# Patient Record
Sex: MALE | Race: BLACK OR AFRICAN AMERICAN | NOT HISPANIC OR LATINO | Employment: FULL TIME | URBAN - METROPOLITAN AREA
[De-identification: names, ages, dates, MRNs, and addresses within clinical notes are randomized per-mention and may not be internally consistent; named-entity substitution may affect disease eponyms.]

---

## 2017-05-15 ENCOUNTER — NON-PROVIDER VISIT (OUTPATIENT)
Dept: OCCUPATIONAL MEDICINE | Facility: CLINIC | Age: 22
End: 2017-05-15

## 2017-05-15 DIAGNOSIS — Z02.1 PRE-EMPLOYMENT DRUG SCREENING: ICD-10-CM

## 2017-05-15 LAB
AMP AMPHETAMINE: NORMAL
COC COCAINE: NORMAL
INT CON NEG: NORMAL
INT CON POS: NORMAL
MET METHAMPHETAMINES: NORMAL
OPI OPIATES: NORMAL
PCP PHENCYCLIDINE: NORMAL
POC DRUG COMMENT 753798-OCCUPATIONAL HEALTH: NEGATIVE
THC: NORMAL

## 2017-05-15 PROCEDURE — 80305 DRUG TEST PRSMV DIR OPT OBS: CPT | Performed by: PREVENTIVE MEDICINE

## 2025-02-05 ENCOUNTER — OFFICE VISIT (OUTPATIENT)
Dept: URGENT CARE | Facility: CLINIC | Age: 30
End: 2025-02-05
Payer: COMMERCIAL

## 2025-02-05 VITALS
RESPIRATION RATE: 16 BRPM | DIASTOLIC BLOOD PRESSURE: 80 MMHG | HEIGHT: 66 IN | OXYGEN SATURATION: 95 % | HEART RATE: 87 BPM | TEMPERATURE: 97.1 F | WEIGHT: 192.2 LBS | SYSTOLIC BLOOD PRESSURE: 122 MMHG | BODY MASS INDEX: 30.89 KG/M2

## 2025-02-05 DIAGNOSIS — J10.1 INFLUENZA A: ICD-10-CM

## 2025-02-05 DIAGNOSIS — J02.9 PHARYNGITIS, UNSPECIFIED ETIOLOGY: ICD-10-CM

## 2025-02-05 LAB
FLUAV RNA SPEC QL NAA+PROBE: POSITIVE
FLUBV RNA SPEC QL NAA+PROBE: NEGATIVE
RSV RNA SPEC QL NAA+PROBE: NEGATIVE
S PYO DNA SPEC NAA+PROBE: NOT DETECTED
SARS-COV-2 RNA RESP QL NAA+PROBE: NEGATIVE

## 2025-02-05 PROCEDURE — 87651 STREP A DNA AMP PROBE: CPT

## 2025-02-05 PROCEDURE — 3074F SYST BP LT 130 MM HG: CPT

## 2025-02-05 PROCEDURE — 0241U POCT CEPHEID COV-2, FLU A/B, RSV - PCR: CPT

## 2025-02-05 PROCEDURE — 3079F DIAST BP 80-89 MM HG: CPT

## 2025-02-05 PROCEDURE — 99204 OFFICE O/P NEW MOD 45 MIN: CPT

## 2025-02-05 RX ORDER — OSELTAMIVIR PHOSPHATE 75 MG/1
75 CAPSULE ORAL 2 TIMES DAILY
Qty: 10 CAPSULE | Refills: 0 | Status: SHIPPED | OUTPATIENT
Start: 2025-02-05

## 2025-02-05 RX ORDER — OSELTAMIVIR PHOSPHATE 75 MG/1
75 CAPSULE ORAL 2 TIMES DAILY
Qty: 10 CAPSULE | Refills: 0 | Status: SHIPPED | OUTPATIENT
Start: 2025-02-05 | End: 2025-02-05

## 2025-02-05 ASSESSMENT — ENCOUNTER SYMPTOMS
EYE DISCHARGE: 0
DIARRHEA: 0
ABDOMINAL PAIN: 0
SPUTUM PRODUCTION: 0
HEADACHES: 0
BLOOD IN STOOL: 0
SORE THROAT: 1
NAUSEA: 0
MYALGIAS: 0
SINUS PAIN: 0
PSYCHIATRIC NEGATIVE: 1
WHEEZING: 0
VOMITING: 0
BLURRED VISION: 0
SHORTNESS OF BREATH: 0
FEVER: 0
DIAPHORESIS: 0
COUGH: 1
WEAKNESS: 0
DIZZINESS: 0
CHILLS: 0

## 2025-02-05 NOTE — PATIENT INSTRUCTIONS
Education:  -A cough can persist for up to 6 weeks.  -Increase fluids.  -Eat whole foods that are high in vitamins and minerals to aid in healing.  -REST! REST! Rest! Let your body rest so it can heal.   -Cool-mist humidifier for nighttime use.   -Practice good hand hygiene.  -You may use either acetaminophen or ibuprofen over-the-counter every 6-8 hours for pain or fever if that is not contraindicated with your body.   -Chloraseptic lozenge, warm tea with honey or throat spray to help with discomfort.   -Zinc 25 mg has been shown to be effective in reducing the duration of cold symptoms.  -Saline Nasal Spray and Flonase may be used for congestion. Nasal saline in the nose helps to help break up nasal secretions and is beneficial for nasal symptoms and throat pain.  -Saline Nasal Rinse with a Neti pot or Jayden med rinse can be used multiple times a day. Be sure to use distilled water or boil tap water for 10 mins. Add a saline packet. Be sure the water is not too hot (around your body temp of 98 degrees)  -Decongestants are not recommended as they can have a rebound congestion effect along with many side effects (especially if you have high blood pressure).   -Salt water gargles for sore throat several times a day.

## 2025-02-05 NOTE — LETTER
February 5, 2025         Patient: Judson Samuel   YOB: 1995   Date of Visit: 2/5/2025           To Whom it May Concern:    Judson Samuel was seen in my clinic on 2/5/2025. He may return to work on 2/6/25.    If you have any questions or concerns, please don't hesitate to call.        Sincerely,           LOIS Lay  Electronically Signed

## 2025-02-05 NOTE — PROGRESS NOTES
"Subjective:   Judson Samuel is a 29 y.o. male who presents for Sore Throat (X 4 days, sore throat, runny nose, cough.)      HPI  Patient complains of sore throat, runny nose, cough for 4 days.  Family sick as well    Negative: muffled voice, drooling, vision changes, recent travel       Review of Systems   Constitutional:  Negative for chills, diaphoresis, fever and malaise/fatigue.   HENT:  Positive for congestion and sore throat. Negative for ear pain and sinus pain.    Eyes:  Negative for blurred vision and discharge.   Respiratory:  Positive for cough. Negative for sputum production, shortness of breath and wheezing.    Cardiovascular:  Negative for chest pain.   Gastrointestinal:  Negative for abdominal pain, blood in stool, diarrhea, nausea and vomiting.   Genitourinary: Negative.    Musculoskeletal:  Negative for myalgias.   Skin:  Negative for rash.   Neurological:  Negative for dizziness, weakness and headaches.   Endo/Heme/Allergies: Negative.    Psychiatric/Behavioral: Negative.     All other systems reviewed and are negative.      Medical History:  History reviewed. No pertinent past medical history.    Allergies:  No Known Allergies    Social history, surgical history, medications, and current problem list reviewed today in Epic.       Objective:     /80   Pulse 87   Temp 36.2 °C (97.1 °F) (Temporal)   Resp 16   Ht 1.676 m (5' 6\")   Wt 87.2 kg (192 lb 3.2 oz)   SpO2 95%     Physical Exam  Vitals reviewed.   Constitutional:       General: He is not in acute distress.     Appearance: Normal appearance. He is not ill-appearing, toxic-appearing or diaphoretic.   HENT:      Head: Normocephalic.      Jaw: There is normal jaw occlusion.      Right Ear: Tympanic membrane, ear canal and external ear normal.      Left Ear: Tympanic membrane, ear canal and external ear normal.      Nose: Congestion and rhinorrhea present.      Right Sinus: No maxillary sinus tenderness or frontal sinus tenderness. "      Left Sinus: No maxillary sinus tenderness or frontal sinus tenderness.      Mouth/Throat:      Lips: Pink.      Mouth: Mucous membranes are moist.      Tongue: Tongue does not deviate from midline.      Pharynx: Oropharynx is clear. Uvula midline. No oropharyngeal exudate, posterior oropharyngeal erythema or uvula swelling.   Eyes:      Extraocular Movements: Extraocular movements intact.      Conjunctiva/sclera: Conjunctivae normal.      Pupils: Pupils are equal, round, and reactive to light.   Cardiovascular:      Rate and Rhythm: Normal rate and regular rhythm.      Pulses: Normal pulses.      Heart sounds: Normal heart sounds.   Pulmonary:      Effort: Pulmonary effort is normal.      Breath sounds: Normal breath sounds.   Abdominal:      General: Abdomen is flat.      Palpations: Abdomen is soft.      Tenderness: There is no abdominal tenderness.   Musculoskeletal:         General: Normal range of motion.      Cervical back: Normal range of motion and neck supple.   Lymphadenopathy:      Cervical: No cervical adenopathy.   Skin:     General: Skin is warm.      Capillary Refill: Capillary refill takes less than 2 seconds.   Neurological:      General: No focal deficit present.      Mental Status: He is alert and oriented to person, place, and time.   Psychiatric:         Mood and Affect: Mood normal.         Behavior: Behavior normal.         Assessment/Plan:       Diagnosis and associated orders:     1. Pharyngitis, unspecified etiology  - POCT CEPHEID GROUP A STREP - PCR  - POCT CEPHEID COV-2, FLU A/B, RSV - PCR  - Referral to establish with PCP    2. Influenza A  - oseltamivir (TAMIFLU) 75 MG Cap; Take 1 Capsule by mouth 2 times a day.  Dispense: 10 Capsule; Refill: 0     Comments/MDM:   I personally reviewed prior external notes and test results pertinent to today's visit as well as additional imaging and testing completed in clinic today.     Very pleasant 29-year-old afebrile, hemodynamically stable,  generally well-appearing male, presenting with complaints of sore throat, runny nose, cough for 4 days.  Normal pulmonary exam, no concern for pneumonia or bronchitis.  Normal ENT exam outside of nasal congestion and rhinorrhea, no concern for acute otitis media, strep pharyngitis, or bacterial sinus infection.   In clinic strep test was negative  In clinic viral testing was positive for influenza.  Patient notified via phone message.   Patient encouraged to increase fluids and rest, cool-mist humidifier, saline nasal rinse with distilled water, cough/throat drops, salt water gargles, tylenol or ibuprofen for fever and/or bodyaches.  At this time I do not believe antibiotics would improve patient's symptoms.  Patient encouraged to follow-up with the clinic in 48 hours for re-evaluation as needed.  Patient given strict instructions to follow up with the emergency room if they develop worsening symptoms.  Patient verbalized understanding.      Patient is clinically stable at today's acute urgent care visit. Vital signs are normal and reassuring.  No acute distress noted. Appropriate for outpatient management at this time. No red flag warnings noted.  Patient given strict instructions to follow up with emergency room if they develop any red flag warnings which were discussed in depth.  They verbalized understanding.      Differential diagnosis, natural history, supportive care, and indications for immediate follow-up discussed. All questions answered. Patient agrees with the plan of care. Advised the patient to follow-up with the primary care physician for recheck, reevaluation, and consideration of further management or the emergency room for worsening symptoms.      Please note that this dictation was created using voice recognition software. I have made every reasonable attempt to correct obvious errors, but I expect that there are errors of grammar and possibly content that I did not discover before finalizing the  note.

## 2025-02-09 ENCOUNTER — OFFICE VISIT (OUTPATIENT)
Dept: URGENT CARE | Facility: CLINIC | Age: 30
End: 2025-02-09
Payer: COMMERCIAL

## 2025-02-09 VITALS
HEART RATE: 87 BPM | DIASTOLIC BLOOD PRESSURE: 62 MMHG | TEMPERATURE: 97.2 F | BODY MASS INDEX: 30.53 KG/M2 | OXYGEN SATURATION: 97 % | WEIGHT: 190 LBS | HEIGHT: 66 IN | RESPIRATION RATE: 14 BRPM | SYSTOLIC BLOOD PRESSURE: 112 MMHG

## 2025-02-09 DIAGNOSIS — H66.001 NON-RECURRENT ACUTE SUPPURATIVE OTITIS MEDIA OF RIGHT EAR WITHOUT SPONTANEOUS RUPTURE OF TYMPANIC MEMBRANE: ICD-10-CM

## 2025-02-09 PROCEDURE — 99213 OFFICE O/P EST LOW 20 MIN: CPT | Performed by: FAMILY MEDICINE

## 2025-02-09 PROCEDURE — 3078F DIAST BP <80 MM HG: CPT | Performed by: FAMILY MEDICINE

## 2025-02-09 PROCEDURE — 3074F SYST BP LT 130 MM HG: CPT | Performed by: FAMILY MEDICINE

## 2025-02-09 RX ORDER — AMOXICILLIN 875 MG/1
875 TABLET, COATED ORAL 2 TIMES DAILY
Qty: 14 TABLET | Refills: 0 | Status: SHIPPED | OUTPATIENT
Start: 2025-02-09 | End: 2025-02-16

## 2025-02-09 ASSESSMENT — ENCOUNTER SYMPTOMS
NAUSEA: 0
WEIGHT LOSS: 0
EYE REDNESS: 0
VOMITING: 0
EYE DISCHARGE: 0
MYALGIAS: 0

## 2025-02-09 NOTE — PROGRESS NOTES
"Subjective     Judson Samuel is a 29 y.o. male who presents with Ear Fullness (Slight irritation, feels closed and unable to hear out of R ear)            2 days right earache. Recent influenza A. No drainage from ear. No recent swimming. No fever. No PMH otitis media. Hearing muffled. No other aggravating or alleviating factors.          Review of Systems   Constitutional:  Negative for malaise/fatigue and weight loss.   Eyes:  Negative for discharge and redness.   Gastrointestinal:  Negative for nausea and vomiting.   Musculoskeletal:  Negative for joint pain and myalgias.   Skin:  Negative for itching and rash.              Objective     /62 (BP Location: Left arm, Patient Position: Sitting, BP Cuff Size: Adult)   Pulse 87   Temp 36.2 °C (97.2 °F) (Temporal)   Resp 14   Ht 1.676 m (5' 6\")   Wt 86.2 kg (190 lb)   SpO2 97%   BMI 30.67 kg/m²      Physical Exam  Constitutional:       General: He is not in acute distress.     Appearance: He is well-developed.   HENT:      Head: Normocephalic and atraumatic.      Left Ear: Tympanic membrane and ear canal normal.      Ears:      Comments: Right TM red and bulging     Nose: Congestion present.      Mouth/Throat:      Pharynx: No posterior oropharyngeal erythema.   Eyes:      Conjunctiva/sclera: Conjunctivae normal.   Cardiovascular:      Rate and Rhythm: Normal rate and regular rhythm.      Heart sounds: Normal heart sounds. No murmur heard.  Pulmonary:      Effort: Pulmonary effort is normal.      Breath sounds: Normal breath sounds. No wheezing.   Skin:     General: Skin is warm and dry.      Findings: No rash.   Neurological:      Mental Status: He is alert.                             Assessment & Plan         1. Non-recurrent acute suppurative otitis media of right ear without spontaneous rupture of tympanic membrane  amoxicillin (AMOXIL) 875 MG tablet            Differential diagnosis, natural history, supportive care, and indications for immediate " follow-up were discussed.

## 2025-02-09 NOTE — LETTER
February 9, 2025         Patient: Judson Samuel   YOB: 1995   Date of Visit: 2/9/2025           To Whom it May Concern:    Judson Samuel was seen in my clinic on 2/9/2025. Please excuse work absences last week due to influenza A.       Sincerely,           Aren Hoffmann M.D.  Electronically Signed